# Patient Record
Sex: MALE
[De-identification: names, ages, dates, MRNs, and addresses within clinical notes are randomized per-mention and may not be internally consistent; named-entity substitution may affect disease eponyms.]

---

## 2021-01-31 PROBLEM — Z00.00 ENCOUNTER FOR PREVENTIVE HEALTH EXAMINATION: Status: ACTIVE | Noted: 2021-01-31

## 2021-02-04 ENCOUNTER — APPOINTMENT (OUTPATIENT)
Dept: ORTHOPEDIC SURGERY | Facility: CLINIC | Age: 61
End: 2021-02-04
Payer: MEDICARE

## 2021-02-04 VITALS — WEIGHT: 220 LBS | BODY MASS INDEX: 32.58 KG/M2 | HEIGHT: 69 IN

## 2021-02-04 DIAGNOSIS — Z78.9 OTHER SPECIFIED HEALTH STATUS: ICD-10-CM

## 2021-02-04 PROCEDURE — 99203 OFFICE O/P NEW LOW 30 MIN: CPT | Mod: 25

## 2021-02-04 PROCEDURE — 20550 NJX 1 TENDON SHEATH/LIGAMENT: CPT | Mod: FA

## 2021-02-05 PROBLEM — Z78.9 CURRENT NON-DRINKER OF ALCOHOL: Status: ACTIVE | Noted: 2021-02-04

## 2021-02-05 PROBLEM — Z78.9 EXERCISES OCCASIONALLY: Status: ACTIVE | Noted: 2021-02-04

## 2021-02-05 PROBLEM — Z78.9 DOES NOT USE ILLICIT DRUGS: Status: ACTIVE | Noted: 2021-02-04

## 2021-02-05 PROBLEM — Z78.9 CURRENT NON-SMOKER: Status: ACTIVE | Noted: 2021-02-04

## 2021-02-05 PROBLEM — Z78.9 NO PERTINENT PAST MEDICAL HISTORY: Status: RESOLVED | Noted: 2021-02-04 | Resolved: 2021-02-05

## 2021-02-05 RX ORDER — TRAMADOL HYDROCHLORIDE AND ACETAMINOPHEN 37.5; 325 MG/1; MG/1
37.5-325 TABLET, FILM COATED ORAL
Qty: 42 | Refills: 0 | Status: ACTIVE | COMMUNITY
Start: 2021-01-28

## 2021-02-05 RX ORDER — ALPRAZOLAM 1 MG/1
1 TABLET ORAL
Qty: 60 | Refills: 0 | Status: ACTIVE | COMMUNITY
Start: 2021-01-28

## 2021-02-05 RX ORDER — METHYLPREDNISOLONE 4 MG/1
4 TABLET ORAL
Qty: 21 | Refills: 0 | Status: ACTIVE | COMMUNITY
Start: 2020-07-20

## 2021-02-05 RX ORDER — IRBESARTAN 300 MG/1
TABLET ORAL
Refills: 0 | Status: ACTIVE | COMMUNITY

## 2021-02-05 RX ORDER — ESOMEPRAZOLE MAGNESIUM 5 MG/1
GRANULE, DELAYED RELEASE ORAL
Refills: 0 | Status: ACTIVE | COMMUNITY

## 2021-02-05 RX ORDER — MUPIROCIN 20 MG/G
2 OINTMENT TOPICAL
Qty: 22 | Refills: 0 | Status: ACTIVE | COMMUNITY
Start: 2020-10-15

## 2021-05-04 ENCOUNTER — TRANSCRIPTION ENCOUNTER (OUTPATIENT)
Age: 61
End: 2021-05-04

## 2021-09-16 ENCOUNTER — APPOINTMENT (OUTPATIENT)
Dept: ORTHOPEDIC SURGERY | Facility: CLINIC | Age: 61
End: 2021-09-16
Payer: MEDICARE

## 2021-09-16 DIAGNOSIS — M65.312 TRIGGER THUMB, LEFT THUMB: ICD-10-CM

## 2021-09-16 DIAGNOSIS — M19.042 PRIMARY OSTEOARTHRITIS, LEFT HAND: ICD-10-CM

## 2021-09-16 DIAGNOSIS — M19.041 PRIMARY OSTEOARTHRITIS, RIGHT HAND: ICD-10-CM

## 2021-09-16 PROCEDURE — 20550 NJX 1 TENDON SHEATH/LIGAMENT: CPT | Mod: FA

## 2021-09-16 PROCEDURE — 99214 OFFICE O/P EST MOD 30 MIN: CPT | Mod: 25

## 2024-08-07 ENCOUNTER — APPOINTMENT (OUTPATIENT)
Dept: ORTHOPEDIC SURGERY | Facility: CLINIC | Age: 64
End: 2024-08-07

## 2024-08-07 ENCOUNTER — APPOINTMENT (OUTPATIENT)
Dept: MRI IMAGING | Facility: CLINIC | Age: 64
End: 2024-08-07

## 2024-08-07 ENCOUNTER — TRANSCRIPTION ENCOUNTER (OUTPATIENT)
Age: 64
End: 2024-08-07

## 2024-08-07 PROCEDURE — 72110 X-RAY EXAM L-2 SPINE 4/>VWS: CPT

## 2024-08-07 PROCEDURE — 99204 OFFICE O/P NEW MOD 45 MIN: CPT

## 2024-08-07 PROCEDURE — 72148 MRI LUMBAR SPINE W/O DYE: CPT

## 2024-08-07 NOTE — HISTORY OF PRESENT ILLNESS
[8] : 8 [2] : 2 [Sharp] : sharp [Shooting] : shooting [de-identified] : 08/07/2024: Patient is a 64 year old male who presents today for evaluation of his lower back. Pt states that pain started 08/2023. Pain radiates down the left leg. Some numbness/tingling. Hx of degenerate disc.  Ice to affected area. Mobic for pain. Denies trauma.  Injury tried mobic and MDP from PMD.  NO PT, acupuncture.  Tried chiro without relief.  Pain is worst in morning.  Improves through out day.  Pain is worst in low back radiating. Pain radiated buttocks to lateral thigh anterior shin to great toe. tries home stretching.,  Wakes him up at night.    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

## 2024-08-07 NOTE — IMAGING
[de-identified] : Spine: Inspection/Palpation: No tenderness to palpation throughout Cervical/thoracic/lumbar spine. No bony stepoffs, No lesions.  Gait: antalgic, able to perform bilateral toe and heel rise.    Range of Motion: Lumbar Spine: Flexion to 90 degrees, Extension to 30 degrees  Neurologic:  Bilateral Lower Extremities 5/5 Iliopsoas/Quadriceps/Hamstrings/ Tibialis Anterior/ Gastrocnemius. Extensor Hallucis Longus/ Flexor Hallucis Longus except    Sensation intact to light touch L2-S1  Patellar/ Achilles reflex within normal limits.  X-ray Ap/Lateral/Flexion/Extension of lumbar spine were viewed and interpreted.  Normal alignment is maintained without any spondylolisthesis.  L5-s1 disc height loss. mutllevel degenerative changes

## 2024-08-07 NOTE — PROCEDURE
[Trigger point 1-2 muscle groups] : trigger point 1-2 muscle groups [Left] : of the left [Lumbar paraspinal muscle] : lumbar paraspinal muscle [Pain] : pain [Alcohol] : alcohol [Betadine] : betadine [Ethyl Chloride sprayed topically] : ethyl chloride sprayed topically [___ cc    1%] : Lidocaine ~Vcc of 1%  [___ cc    40mg] : Triamcinolone (Kenalog) ~Vcc of 40 mg  [] : Patient tolerated procedure well [Call if redness, pain or fever occur] : call if redness, pain or fever occur [Risks, benefits, alternatives discussed / Verbal consent obtained] : the risks benefits, and alternatives have been discussed, and verbal consent was obtained

## 2024-08-07 NOTE — ASSESSMENT
[FreeTextEntry1] : 64M with Low back pain and LLE radic Failed MDP and NSAIDS Pain persists MRI L spine FU after MRI trial of tizanidine

## 2024-08-14 ENCOUNTER — APPOINTMENT (OUTPATIENT)
Dept: ORTHOPEDIC SURGERY | Facility: CLINIC | Age: 64
End: 2024-08-14
Payer: MEDICARE

## 2024-08-14 DIAGNOSIS — M54.42 LUMBAGO WITH SCIATICA, LEFT SIDE: ICD-10-CM

## 2024-08-14 PROCEDURE — 99213 OFFICE O/P EST LOW 20 MIN: CPT

## 2024-08-14 RX ORDER — MELOXICAM 15 MG/1
15 TABLET ORAL DAILY
Qty: 28 | Refills: 1 | Status: ACTIVE | COMMUNITY
Start: 2024-08-14 | End: 1900-01-01

## 2024-08-14 NOTE — DATA REVIEWED
[MRI] : MRI [Lumbar Spine] : lumbar spine [I independently reviewed and interpreted images and report] : I independently reviewed and interpreted images and report [FreeTextEntry1] : multilevel degen changes. no fractures.  Stenosis worst at L4-5 and L%-s1

## 2024-08-14 NOTE — ASSESSMENT
[FreeTextEntry1] : 64M with Low back pain and LLE radic Pain imrpoving PT  We will also provide a prescription for anti-inflammatories.  Discussed major side effects of medication including but not limited to gastritis and acute kidney injury.  He was instructed to take with food and to discontinue use if stomach or esophageal pain developed.  FU 2 months

## 2024-08-14 NOTE — IMAGING
[de-identified] : Spine: Inspection/Palpation: No tenderness to palpation throughout Cervical/thoracic/lumbar spine. No bony stepoffs, No lesions.  Gait: antalgic, able to perform bilateral toe and heel rise.    Range of Motion: Lumbar Spine: Flexion to 90 degrees, Extension to 30 degrees  Neurologic:  Bilateral Lower Extremities 5/5 Iliopsoas/Quadriceps/Hamstrings/ Tibialis Anterior/ Gastrocnemius. Extensor Hallucis Longus/ Flexor Hallucis Longus except    Sensation intact to light touch L2-S1  Patellar/ Achilles reflex within normal limits.  X-ray Ap/Lateral/Flexion/Extension of lumbar spine were viewed and interpreted.  Normal alignment is maintained without any spondylolisthesis.  L5-s1 disc height loss. mutllevel degenerative changes

## 2024-08-14 NOTE — HISTORY OF PRESENT ILLNESS
[8] : 8 [2] : 2 [Sharp] : sharp [Shooting] : shooting [de-identified] : 08/14/24: F/U lower back. Pt is here for MRI review. CSI from last visit helped.  08/07/2024: Patient is a 64 year old male who presents today for evaluation of his lower back. Pt states that pain started 08/2023. Pain radiates down the left leg. Some numbness/tingling. Hx of degenerate disc.  Ice to affected area. Mobic for pain. Denies trauma.  Injury tried mobic and MDP from PMD.  NO PT, acupuncture.  Tried chiro without relief.  Pain is worst in morning.  Improves through out day.  Pain is worst in low back radiating. Pain radiated buttocks to lateral thigh anterior shin to great toe. tries home stretching.,  Wakes him up at night.    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

## 2024-09-11 ENCOUNTER — APPOINTMENT (OUTPATIENT)
Dept: ORTHOPEDIC SURGERY | Facility: CLINIC | Age: 64
End: 2024-09-11
Payer: MEDICARE

## 2024-09-11 PROCEDURE — 99213 OFFICE O/P EST LOW 20 MIN: CPT

## 2024-09-11 NOTE — ASSESSMENT
[FreeTextEntry1] : 64M with Low back pain and LLE radic No longer seeing improvement with PT Pain management referral

## 2024-09-11 NOTE — IMAGING
[de-identified] : Spine: Inspection/Palpation: No tenderness to palpation throughout Cervical/thoracic/lumbar spine. No bony stepoffs, No lesions.  Gait: antalgic, able to perform bilateral toe and heel rise.    Range of Motion: Lumbar Spine: Flexion to 90 degrees, Extension to 30 degrees  Neurologic:  Bilateral Lower Extremities 5/5 Iliopsoas/Quadriceps/Hamstrings/ Tibialis Anterior/ Gastrocnemius. Extensor Hallucis Longus/ Flexor Hallucis Longus except    Sensation intact to light touch L2-S1  Patellar/ Achilles reflex within normal limits.  X-ray Ap/Lateral/Flexion/Extension of lumbar spine were viewed and interpreted.  Normal alignment is maintained without any spondylolisthesis.  L5-s1 disc height loss. mutllevel degenerative changes

## 2024-09-11 NOTE — HISTORY OF PRESENT ILLNESS
[8] : 8 [2] : 2 [Sharp] : sharp [Shooting] : shooting [de-identified] : 09/11/2024: Patient is here today for a follow up for the lower back pain. Patient reports that he has been attending physical therapy 2x a week and has been making minimal improvement, would like to discuss possible epidural. Doing about the same since the last visit.   08/14/24: F/U lower back. Pt is here for MRI review. CSI from last visit helped.  08/07/2024: Patient is a 64 year old male who presents today for evaluation of his lower back. Pt states that pain started 08/2023. Pain radiates down the left leg. Some numbness/tingling. Hx of degenerate disc.  Ice to affected area. Mobic for pain. Denies trauma.  Injury tried mobic and MDP from PMD.  NO PT, acupuncture.  Tried chiro without relief.  Pain is worst in morning.  Improves through out day.  Pain is worst in low back radiating. Pain radiated buttocks to lateral thigh anterior shin to great toe. tries home stretching.,  Wakes him up at night.    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

## 2024-09-12 PROBLEM — M54.16 LUMBAR RADICULITIS: Status: ACTIVE | Noted: 2024-09-12

## 2024-09-12 PROBLEM — M51.9 LUMBAR DISC DISEASE: Status: ACTIVE | Noted: 2024-09-12

## 2024-09-12 PROBLEM — M47.816 LUMBAR FACET ARTHROPATHY: Status: ACTIVE | Noted: 2024-09-12

## 2024-09-13 ENCOUNTER — APPOINTMENT (OUTPATIENT)
Dept: PAIN MANAGEMENT | Facility: CLINIC | Age: 64
End: 2024-09-13
Payer: MEDICARE

## 2024-09-13 VITALS — HEIGHT: 69 IN | WEIGHT: 225 LBS | BODY MASS INDEX: 33.33 KG/M2

## 2024-09-13 DIAGNOSIS — M54.42 LUMBAGO WITH SCIATICA, LEFT SIDE: ICD-10-CM

## 2024-09-13 DIAGNOSIS — M54.16 RADICULOPATHY, LUMBAR REGION: ICD-10-CM

## 2024-09-13 DIAGNOSIS — M47.816 SPONDYLOSIS W/OUT MYELOPATHY OR RADICULOPATHY, LUMBAR REGION: ICD-10-CM

## 2024-09-13 DIAGNOSIS — M51.9 UNSPECIFIED THORACIC, THORACOLUMBAR AND LUMBOSACRAL INTERVERTEBRAL DISC DISORDER: ICD-10-CM

## 2024-09-13 PROCEDURE — 99204 OFFICE O/P NEW MOD 45 MIN: CPT

## 2024-09-13 RX ORDER — METHYLPREDNISOLONE 4 MG/1
4 TABLET ORAL
Qty: 1 | Refills: 0 | Status: ACTIVE | COMMUNITY
Start: 2024-09-13 | End: 1900-01-01

## 2024-09-13 NOTE — HISTORY OF PRESENT ILLNESS
[Lower back] : lower back [Buttock] : buttock [Left Leg] : left leg [10] : 10 [5] : 5 [Dull/Aching] : dull/aching [Radiating] : radiating [Sharp] : sharp [Throbbing] : throbbing [Tightness] : tightness [Tingling] : tingling [Intermittent] : intermittent [Household chores] : household chores [Leisure] : leisure [Sleep] : sleep [Meds] : meds [Physical therapy] : physical therapy [FreeTextEntry1] : 9/13/2024: JAMAL BAGLEY is a 64 year-old man presenting for a NPV for a history of chronic low back pain. The patient notes having a 1-2 year history of low back pain. However, the patient notes significant worsening of pain when he was on vacation in July. He was lifting up his 9 year-old grandson and felt acute onset of pain in the low back. At this point, the patient notes an aching pain in the left low back with radiation to the left posterior thigh and posterolateral leg. He notes numbness and tingling, but no weakness.  The patient states that average pain over the past week was 8/10 in severity. Mood: Patient notes anxiety, denies depression.  Sleep: Patient denies difficulty with sleep 2/2 pain.  [] : no [FreeTextEntry6] : numbness and tingling in  l leg  [FreeTextEntry7] : L leg  [de-identified] : getting out of bed in the AM [de-identified] : x-ray and MRI

## 2024-09-13 NOTE — DISCUSSION/SUMMARY
[de-identified] : JAMAL BAGLEY is a 64 year-old man presenting for a NPV for a history of chronic low back pain.   Prior treatment: Physical therapy x4 weeks Meloxicam Heat, rest, ice OTC NSAIDs  Plan: 1) MRI lumbar spine images reviewed with the patient. 2) Schedule LEFT L5-S1, S1 TFESI w/o MAC. The procedure was explained to the patient in detail. Reviewed risks, benefits, and alternatives with the patient. Some risks discussed included temporary increase in pain, bleeding, infection, and side effects from steroids. The patient expressed understanding and would like to proceed.  3) Continue meloxicam 15mg daily prn 4) Continue physical therapy 5) RTC post procedure

## 2024-09-13 NOTE — PHYSICAL EXAM
[de-identified] : Gen: NAD Head: NC/AT Eyes: no glasses, no scleral icterus ENT: mucous membranes moist CV: No JVD Lungs: nonlabored breathing Abd: soft, NT/ND Ext: full ROM in all extremities, no peripheral edema Back: +TTP in the left low lumbar facet region, +SLR on the left Neuro: CN intact LEs +5 L +5 R hip flexion +5 L +5 R leg extension +5 L +5 R leg flexion +5 L +5 R foot dorsiflexion +5 L +5 R foot plantarflexion +5 L +5 R EHL extension Psych: normal affect Skin: no visible lesions

## 2024-09-13 NOTE — DISCUSSION/SUMMARY
[de-identified] : JAMAL BAGLEY is a 64 year-old man presenting for a NPV for a history of chronic low back pain.   Prior treatment: Physical therapy x4 weeks Meloxicam Heat, rest, ice OTC NSAIDs  Plan: 1) MRI lumbar spine images reviewed with the patient. 2) Schedule LEFT L5-S1, S1 TFESI w/o MAC. The procedure was explained to the patient in detail. Reviewed risks, benefits, and alternatives with the patient. Some risks discussed included temporary increase in pain, bleeding, infection, and side effects from steroids. The patient expressed understanding and would like to proceed.  3) Continue meloxicam 15mg daily prn 4) Continue physical therapy 5) RTC post procedure

## 2024-09-13 NOTE — HISTORY OF PRESENT ILLNESS
[Lower back] : lower back [Buttock] : buttock [Left Leg] : left leg [10] : 10 [5] : 5 [Dull/Aching] : dull/aching [Radiating] : radiating [Sharp] : sharp [Throbbing] : throbbing [Tightness] : tightness [Tingling] : tingling [Intermittent] : intermittent [Household chores] : household chores [Leisure] : leisure [Sleep] : sleep [Meds] : meds [Physical therapy] : physical therapy [FreeTextEntry1] : 9/13/2024: JAMAL BAGLEY is a 64 year-old man presenting for a NPV for a history of chronic low back pain. The patient notes having a 1-2 year history of low back pain. However, the patient notes significant worsening of pain when he was on vacation in July. He was lifting up his 9 year-old grandson and felt acute onset of pain in the low back. At this point, the patient notes an aching pain in the left low back with radiation to the left posterior thigh and posterolateral leg. He notes numbness and tingling, but no weakness.  The patient states that average pain over the past week was 8/10 in severity. Mood: Patient notes anxiety, denies depression.  Sleep: Patient denies difficulty with sleep 2/2 pain.  [] : no [FreeTextEntry6] : numbness and tingling in  l leg  [FreeTextEntry7] : L leg  [de-identified] : getting out of bed in the AM [de-identified] : x-ray and MRI

## 2024-09-13 NOTE — DATA REVIEWED
[MRI] : MRI [Lumbar Spine] : lumbar spine [Report was reviewed and noted in the chart] : The report was reviewed and noted in the chart [I independently reviewed and interpreted images and report] : I independently reviewed and interpreted images and report [FreeTextEntry1] : 8/7/2024 MRI Lumbar Spine O&C L3-L4: disc bulge w/ central protrusion narrowing the central canal to 8mm L4-L5: disc bulge, small extruded disc fragment centrally extruding inferiorly; mild ventral thecal sac indentation, mild facet arthropathy L5-S1: severe disc height loss, modic 2 changes; disc bulge w/ left paracentral disc protrusion severely compressing the traversing nerve root on the LEFT; facet arthrosis to the left contributes to moderate/severe NF stenosis

## 2024-09-13 NOTE — PHYSICAL EXAM
[de-identified] : Gen: NAD Head: NC/AT Eyes: no glasses, no scleral icterus ENT: mucous membranes moist CV: No JVD Lungs: nonlabored breathing Abd: soft, NT/ND Ext: full ROM in all extremities, no peripheral edema Back: +TTP in the left low lumbar facet region, +SLR on the left Neuro: CN intact LEs +5 L +5 R hip flexion +5 L +5 R leg extension +5 L +5 R leg flexion +5 L +5 R foot dorsiflexion +5 L +5 R foot plantarflexion +5 L +5 R EHL extension Psych: normal affect Skin: no visible lesions

## 2024-09-26 ENCOUNTER — APPOINTMENT (OUTPATIENT)
Dept: PAIN MANAGEMENT | Facility: CLINIC | Age: 64
End: 2024-09-26

## 2024-09-26 DIAGNOSIS — M54.16 RADICULOPATHY, LUMBAR REGION: ICD-10-CM

## 2024-09-26 DIAGNOSIS — M51.9 UNSPECIFIED THORACIC, THORACOLUMBAR AND LUMBOSACRAL INTERVERTEBRAL DISC DISORDER: ICD-10-CM

## 2024-09-26 DIAGNOSIS — M54.42 LUMBAGO WITH SCIATICA, LEFT SIDE: ICD-10-CM

## 2024-09-26 PROCEDURE — 64484 NJX AA&/STRD TFRM EPI L/S EA: CPT | Mod: LT

## 2024-09-26 PROCEDURE — 64483 NJX AA&/STRD TFRM EPI L/S 1: CPT | Mod: LT

## 2024-09-26 NOTE — PROCEDURE
[FreeTextEntry1] : L5-S1, S1 transforaminal epidural steroid injection [FreeTextEntry2] : chronic lumbar radiculopathy [FreeTextEntry3] : Procedure Date: 09/26/2024   Preoperative Diagnosis: chronic lumbar radiculopathy   Procedure: LEFT L5-S1, S1 transforaminal epidural steroid injection under fluoroscopic guidance  Anesthesia: local  Complications: none   EBL: none   Procedure in detail:   Patient was seen and examined. Risks, benefits, and alternatives for the procedure were discussed with the patient in detail. The patient expressed understanding, gave written and verbal consent, and placed themselves in a prone position on the procedure table. Skin overlying the lumbosacral spine was prepped with chloraprep and draped in the usual sterile fashion. Fluoroscopic images were obtained to identify the L5 and S1 vertebral bodies. Target was the 6 o'clock position under the LEFT pedicle at the L5 and S1 vertebral level. Skin overlying the target was marked and infiltrated with 1% lidocaine. Using two 22 gauge, 5 inch spinal needles, these were inserted and advanced under intermittent fluoroscopic guidance. When felt to be engaged in the epidural space, lateral view was used to confirm depth. After negative aspiration for heme/csf, contrast was injected under live fluoroscopy, which showed contrast spread consistent with epidural flow. No evidence of intravascular or intrathecal uptake. At this point, a total of 2ml of injectate, which consisted of 1ml of 6mg/ml betamethasone and 1ml of normal saline were injected through each needle. The needles were restyletted and withdrawn, a band aid was placed over the injection site. Patient tolerated the procedure well. The patient recovered uneventfully and was discharged home in stable condition.

## 2024-10-15 ENCOUNTER — APPOINTMENT (OUTPATIENT)
Dept: PAIN MANAGEMENT | Facility: CLINIC | Age: 64
End: 2024-10-15
Payer: MEDICARE

## 2024-10-15 VITALS — HEIGHT: 69 IN | WEIGHT: 229 LBS | BODY MASS INDEX: 33.92 KG/M2

## 2024-10-15 PROCEDURE — 20552 NJX 1/MLT TRIGGER POINT 1/2: CPT

## 2024-10-15 PROCEDURE — 99214 OFFICE O/P EST MOD 30 MIN: CPT | Mod: 25

## 2024-10-18 ENCOUNTER — APPOINTMENT (OUTPATIENT)
Dept: PAIN MANAGEMENT | Facility: CLINIC | Age: 64
End: 2024-10-18
Payer: MEDICARE

## 2024-10-18 DIAGNOSIS — M54.16 RADICULOPATHY, LUMBAR REGION: ICD-10-CM

## 2024-10-18 DIAGNOSIS — M51.9 UNSPECIFIED THORACIC, THORACOLUMBAR AND LUMBOSACRAL INTERVERTEBRAL DISC DISORDER: ICD-10-CM

## 2024-10-18 PROCEDURE — 62323 NJX INTERLAMINAR LMBR/SAC: CPT

## 2024-10-30 ENCOUNTER — APPOINTMENT (OUTPATIENT)
Dept: ORTHOPEDIC SURGERY | Facility: CLINIC | Age: 64
End: 2024-10-30
Payer: MEDICARE

## 2024-10-30 VITALS — HEIGHT: 69 IN | BODY MASS INDEX: 33.92 KG/M2 | WEIGHT: 229 LBS

## 2024-10-30 DIAGNOSIS — M47.816 SPONDYLOSIS W/OUT MYELOPATHY OR RADICULOPATHY, LUMBAR REGION: ICD-10-CM

## 2024-10-30 PROCEDURE — 99213 OFFICE O/P EST LOW 20 MIN: CPT

## 2024-11-01 ENCOUNTER — APPOINTMENT (OUTPATIENT)
Dept: PAIN MANAGEMENT | Facility: CLINIC | Age: 64
End: 2024-11-01
Payer: MEDICARE

## 2024-11-01 VITALS — WEIGHT: 228 LBS | HEIGHT: 69 IN | BODY MASS INDEX: 33.77 KG/M2

## 2024-11-01 DIAGNOSIS — M54.16 RADICULOPATHY, LUMBAR REGION: ICD-10-CM

## 2024-11-01 DIAGNOSIS — M51.9 UNSPECIFIED THORACIC, THORACOLUMBAR AND LUMBOSACRAL INTERVERTEBRAL DISC DISORDER: ICD-10-CM

## 2024-11-01 DIAGNOSIS — M54.42 LUMBAGO WITH SCIATICA, LEFT SIDE: ICD-10-CM

## 2024-11-01 PROCEDURE — 99214 OFFICE O/P EST MOD 30 MIN: CPT

## 2024-11-01 RX ORDER — TIZANIDINE 4 MG/1
4 TABLET ORAL TWICE DAILY
Qty: 60 | Refills: 1 | Status: ACTIVE | COMMUNITY
Start: 2024-11-01 | End: 1900-01-01

## 2024-12-18 ENCOUNTER — APPOINTMENT (OUTPATIENT)
Dept: ORTHOPEDIC SURGERY | Facility: CLINIC | Age: 64
End: 2024-12-18

## 2025-01-07 ENCOUNTER — APPOINTMENT (OUTPATIENT)
Dept: PAIN MANAGEMENT | Facility: CLINIC | Age: 65
End: 2025-01-07
Payer: MEDICARE

## 2025-01-07 ENCOUNTER — RX RENEWAL (OUTPATIENT)
Age: 65
End: 2025-01-07

## 2025-01-07 VITALS — BODY MASS INDEX: 35.1 KG/M2 | WEIGHT: 237 LBS | HEIGHT: 69 IN

## 2025-01-07 DIAGNOSIS — M54.42 LUMBAGO WITH SCIATICA, LEFT SIDE: ICD-10-CM

## 2025-01-07 DIAGNOSIS — M51.9 UNSPECIFIED THORACIC, THORACOLUMBAR AND LUMBOSACRAL INTERVERTEBRAL DISC DISORDER: ICD-10-CM

## 2025-01-07 DIAGNOSIS — M47.816 SPONDYLOSIS W/OUT MYELOPATHY OR RADICULOPATHY, LUMBAR REGION: ICD-10-CM

## 2025-01-07 DIAGNOSIS — M54.16 RADICULOPATHY, LUMBAR REGION: ICD-10-CM

## 2025-01-07 PROCEDURE — 99214 OFFICE O/P EST MOD 30 MIN: CPT

## 2025-01-23 ENCOUNTER — APPOINTMENT (OUTPATIENT)
Dept: PAIN MANAGEMENT | Facility: CLINIC | Age: 65
End: 2025-01-23
Payer: MEDICARE

## 2025-01-23 DIAGNOSIS — M51.9 UNSPECIFIED THORACIC, THORACOLUMBAR AND LUMBOSACRAL INTERVERTEBRAL DISC DISORDER: ICD-10-CM

## 2025-01-23 DIAGNOSIS — M54.16 RADICULOPATHY, LUMBAR REGION: ICD-10-CM

## 2025-01-23 PROCEDURE — 62323 NJX INTERLAMINAR LMBR/SAC: CPT

## 2025-03-17 ENCOUNTER — RX RENEWAL (OUTPATIENT)
Age: 65
End: 2025-03-17

## 2025-04-09 ENCOUNTER — APPOINTMENT (OUTPATIENT)
Dept: PAIN MANAGEMENT | Facility: CLINIC | Age: 65
End: 2025-04-09
Payer: MEDICARE

## 2025-04-09 VITALS — WEIGHT: 234 LBS | BODY MASS INDEX: 34.66 KG/M2 | HEIGHT: 69 IN

## 2025-04-09 DIAGNOSIS — M51.9 UNSPECIFIED THORACIC, THORACOLUMBAR AND LUMBOSACRAL INTERVERTEBRAL DISC DISORDER: ICD-10-CM

## 2025-04-09 DIAGNOSIS — M54.16 RADICULOPATHY, LUMBAR REGION: ICD-10-CM

## 2025-04-09 DIAGNOSIS — M47.816 SPONDYLOSIS W/OUT MYELOPATHY OR RADICULOPATHY, LUMBAR REGION: ICD-10-CM

## 2025-04-09 PROCEDURE — 99214 OFFICE O/P EST MOD 30 MIN: CPT

## 2025-04-09 RX ORDER — DICLOFENAC SODIUM 75 MG/1
75 TABLET, DELAYED RELEASE ORAL
Qty: 60 | Refills: 1 | Status: ACTIVE | COMMUNITY
Start: 2025-04-09 | End: 1900-01-01

## 2025-04-24 ENCOUNTER — APPOINTMENT (OUTPATIENT)
Dept: PAIN MANAGEMENT | Facility: CLINIC | Age: 65
End: 2025-04-24
Payer: MEDICARE

## 2025-04-24 DIAGNOSIS — M54.16 RADICULOPATHY, LUMBAR REGION: ICD-10-CM

## 2025-04-24 DIAGNOSIS — M51.9 UNSPECIFIED THORACIC, THORACOLUMBAR AND LUMBOSACRAL INTERVERTEBRAL DISC DISORDER: ICD-10-CM

## 2025-04-24 PROCEDURE — 62323 NJX INTERLAMINAR LMBR/SAC: CPT

## 2025-05-09 ENCOUNTER — APPOINTMENT (OUTPATIENT)
Dept: PAIN MANAGEMENT | Facility: CLINIC | Age: 65
End: 2025-05-09

## 2025-05-28 ENCOUNTER — RX RENEWAL (OUTPATIENT)
Age: 65
End: 2025-05-28

## 2025-06-11 ENCOUNTER — RX RENEWAL (OUTPATIENT)
Age: 65
End: 2025-06-11

## 2025-07-30 ENCOUNTER — APPOINTMENT (OUTPATIENT)
Dept: PAIN MANAGEMENT | Facility: CLINIC | Age: 65
End: 2025-07-30
Payer: MEDICARE

## 2025-07-30 VITALS — HEIGHT: 69 IN | BODY MASS INDEX: 33.33 KG/M2 | WEIGHT: 225 LBS

## 2025-07-30 DIAGNOSIS — M51.9 UNSPECIFIED THORACIC, THORACOLUMBAR AND LUMBOSACRAL INTERVERTEBRAL DISC DISORDER: ICD-10-CM

## 2025-07-30 PROCEDURE — 99214 OFFICE O/P EST MOD 30 MIN: CPT

## 2025-08-01 ENCOUNTER — APPOINTMENT (OUTPATIENT)
Dept: ORTHOPEDIC SURGERY | Facility: CLINIC | Age: 65
End: 2025-08-01

## 2025-08-04 ENCOUNTER — RX RENEWAL (OUTPATIENT)
Age: 65
End: 2025-08-04

## 2025-08-07 ENCOUNTER — APPOINTMENT (OUTPATIENT)
Dept: PAIN MANAGEMENT | Facility: CLINIC | Age: 65
End: 2025-08-07
Payer: MEDICARE

## 2025-08-07 DIAGNOSIS — M54.16 RADICULOPATHY, LUMBAR REGION: ICD-10-CM

## 2025-08-07 DIAGNOSIS — M54.42 LUMBAGO WITH SCIATICA, LEFT SIDE: ICD-10-CM

## 2025-08-07 PROCEDURE — 62323 NJX INTERLAMINAR LMBR/SAC: CPT

## 2025-08-12 ENCOUNTER — RX RENEWAL (OUTPATIENT)
Age: 65
End: 2025-08-12

## 2025-08-27 ENCOUNTER — APPOINTMENT (OUTPATIENT)
Dept: PAIN MANAGEMENT | Facility: CLINIC | Age: 65
End: 2025-08-27
Payer: MEDICARE

## 2025-08-27 VITALS — WEIGHT: 226 LBS | HEIGHT: 69 IN | BODY MASS INDEX: 33.47 KG/M2

## 2025-08-27 DIAGNOSIS — M54.16 RADICULOPATHY, LUMBAR REGION: ICD-10-CM

## 2025-08-27 DIAGNOSIS — M47.816 SPONDYLOSIS W/OUT MYELOPATHY OR RADICULOPATHY, LUMBAR REGION: ICD-10-CM

## 2025-08-27 DIAGNOSIS — M51.9 UNSPECIFIED THORACIC, THORACOLUMBAR AND LUMBOSACRAL INTERVERTEBRAL DISC DISORDER: ICD-10-CM

## 2025-08-27 PROCEDURE — 99214 OFFICE O/P EST MOD 30 MIN: CPT
